# Patient Record
Sex: FEMALE | Race: BLACK OR AFRICAN AMERICAN | NOT HISPANIC OR LATINO | URBAN - METROPOLITAN AREA
[De-identification: names, ages, dates, MRNs, and addresses within clinical notes are randomized per-mention and may not be internally consistent; named-entity substitution may affect disease eponyms.]

---
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
## 2020-02-21 ENCOUNTER — RX ONLY (RX ONLY)
Age: 20
End: 2020-02-21

## 2020-02-21 ENCOUNTER — OTHER- (OUTPATIENT)
Dept: URBAN - METROPOLITAN AREA CLINIC 9 | Facility: CLINIC | Age: 20
Setting detail: DERMATOLOGY
End: 2020-02-21

## 2020-02-21 DIAGNOSIS — Z08 ENCOUNTER FOR FOLLOW-UP EXAMINATION AFTER COMPLETED TREATMENT FOR MALIGNANT NEOPLASM: ICD-10-CM

## 2020-02-21 PROCEDURE — 99213 OFFICE O/P EST LOW 20 MIN: CPT

## 2020-02-21 RX ORDER — HYDROCORTISONE BUTYRATE 1 MG/G
1 APPLICATION CREAM TOPICAL BID
Qty: 15 | Refills: 1
Start: 2020-02-21

## 2020-06-03 ENCOUNTER — RX ONLY (RX ONLY)
Age: 20
End: 2020-06-03

## 2020-06-03 RX ORDER — HYDROCORTISONE BUTYRATE 1 MG/G
1 APPLICATION CREAM TOPICAL BID
Qty: 15 | Refills: 1
Start: 2020-06-03

## 2024-06-07 ENCOUNTER — OFFICE VISIT (OUTPATIENT)
Age: 24
End: 2024-06-07
Payer: COMMERCIAL

## 2024-06-07 VITALS
BODY MASS INDEX: 32.18 KG/M2 | HEART RATE: 85 BPM | RESPIRATION RATE: 18 BRPM | TEMPERATURE: 97.9 F | HEIGHT: 67 IN | DIASTOLIC BLOOD PRESSURE: 66 MMHG | SYSTOLIC BLOOD PRESSURE: 100 MMHG | WEIGHT: 205 LBS | OXYGEN SATURATION: 99 %

## 2024-06-07 DIAGNOSIS — E66.9 OBESITY (BMI 30-39.9): ICD-10-CM

## 2024-06-07 DIAGNOSIS — Z13.0 SCREENING FOR ENDOCRINE, NUTRITIONAL, METABOLIC AND IMMUNITY DISORDER: ICD-10-CM

## 2024-06-07 DIAGNOSIS — Z13.228 SCREENING FOR ENDOCRINE, NUTRITIONAL, METABOLIC AND IMMUNITY DISORDER: ICD-10-CM

## 2024-06-07 DIAGNOSIS — L30.9 ECZEMA, UNSPECIFIED TYPE: ICD-10-CM

## 2024-06-07 DIAGNOSIS — Z13.21 SCREENING FOR ENDOCRINE, NUTRITIONAL, METABOLIC AND IMMUNITY DISORDER: ICD-10-CM

## 2024-06-07 DIAGNOSIS — Z13.29 SCREENING FOR ENDOCRINE, NUTRITIONAL, METABOLIC AND IMMUNITY DISORDER: ICD-10-CM

## 2024-06-07 DIAGNOSIS — R53.82 CHRONIC FATIGUE: Primary | ICD-10-CM

## 2024-06-07 DIAGNOSIS — J30.1 SEASONAL ALLERGIC RHINITIS DUE TO POLLEN: ICD-10-CM

## 2024-06-07 DIAGNOSIS — Z76.89 ENCOUNTER TO ESTABLISH CARE: ICD-10-CM

## 2024-06-07 DIAGNOSIS — R53.82 CHRONIC FATIGUE: ICD-10-CM

## 2024-06-07 PROBLEM — B49 ALLERGIC FUNGAL SINUSITIS (AFS): Status: ACTIVE | Noted: 2018-11-30

## 2024-06-07 PROBLEM — L83 ACANTHOSIS NIGRICANS: Status: ACTIVE | Noted: 2023-04-03

## 2024-06-07 PROBLEM — R10.2 PELVIC PAIN: Status: RESOLVED | Noted: 2023-09-01 | Resolved: 2024-06-07

## 2024-06-07 PROBLEM — R10.2 PELVIC PAIN: Status: ACTIVE | Noted: 2023-09-01

## 2024-06-07 PROBLEM — J30.89 ALLERGIC FUNGAL SINUSITIS (AFS): Status: ACTIVE | Noted: 2018-11-30

## 2024-06-07 PROBLEM — L70.0 ACNE VULGARIS: Status: ACTIVE | Noted: 2023-04-03

## 2024-06-07 PROBLEM — J32.4 CHRONIC PANSINUSITIS: Status: ACTIVE | Noted: 2022-11-22

## 2024-06-07 LAB
ALBUMIN SERPL-MCNC: 3.5 G/DL (ref 3.5–5)
ALBUMIN/GLOB SERPL: 0.8 (ref 1.1–2.2)
ALP SERPL-CCNC: 55 U/L (ref 45–117)
ALT SERPL-CCNC: 19 U/L (ref 12–78)
ANION GAP SERPL CALC-SCNC: 3 MMOL/L (ref 5–15)
AST SERPL-CCNC: 17 U/L (ref 15–37)
BASOPHILS # BLD: 0 K/UL (ref 0–0.1)
BASOPHILS NFR BLD: 1 % (ref 0–1)
BILIRUB SERPL-MCNC: 0.3 MG/DL (ref 0.2–1)
BUN SERPL-MCNC: 10 MG/DL (ref 6–20)
BUN/CREAT SERPL: 12 (ref 12–20)
CALCIUM SERPL-MCNC: 9.3 MG/DL (ref 8.5–10.1)
CHLORIDE SERPL-SCNC: 109 MMOL/L (ref 97–108)
CHOLEST SERPL-MCNC: 195 MG/DL
CO2 SERPL-SCNC: 25 MMOL/L (ref 21–32)
CREAT SERPL-MCNC: 0.86 MG/DL (ref 0.55–1.02)
DIFFERENTIAL METHOD BLD: NORMAL
EOSINOPHIL # BLD: 0.2 K/UL (ref 0–0.4)
EOSINOPHIL NFR BLD: 4 % (ref 0–7)
ERYTHROCYTE [DISTWIDTH] IN BLOOD BY AUTOMATED COUNT: 13.1 % (ref 11.5–14.5)
EST. AVERAGE GLUCOSE BLD GHB EST-MCNC: 97 MG/DL
GLOBULIN SER CALC-MCNC: 4.3 G/DL (ref 2–4)
GLUCOSE SERPL-MCNC: 94 MG/DL (ref 65–100)
HBA1C MFR BLD: 5 % (ref 4–5.6)
HCT VFR BLD AUTO: 38.4 % (ref 35–47)
HDLC SERPL-MCNC: 89 MG/DL
HDLC SERPL: 2.2 (ref 0–5)
HGB BLD-MCNC: 12.7 G/DL (ref 11.5–16)
IMM GRANULOCYTES # BLD AUTO: 0 K/UL (ref 0–0.04)
IMM GRANULOCYTES NFR BLD AUTO: 0 % (ref 0–0.5)
LDLC SERPL CALC-MCNC: 96.8 MG/DL (ref 0–100)
LYMPHOCYTES # BLD: 2.5 K/UL (ref 0.8–3.5)
LYMPHOCYTES NFR BLD: 42 % (ref 12–49)
MCH RBC QN AUTO: 30.4 PG (ref 26–34)
MCHC RBC AUTO-ENTMCNC: 33.1 G/DL (ref 30–36.5)
MCV RBC AUTO: 91.9 FL (ref 80–99)
MONOCYTES # BLD: 0.5 K/UL (ref 0–1)
MONOCYTES NFR BLD: 8 % (ref 5–13)
NEUTS SEG # BLD: 2.7 K/UL (ref 1.8–8)
NEUTS SEG NFR BLD: 45 % (ref 32–75)
NRBC # BLD: 0 K/UL (ref 0–0.01)
NRBC BLD-RTO: 0 PER 100 WBC
PLATELET # BLD AUTO: 287 K/UL (ref 150–400)
PMV BLD AUTO: 9.9 FL (ref 8.9–12.9)
POTASSIUM SERPL-SCNC: 4.7 MMOL/L (ref 3.5–5.1)
PROT SERPL-MCNC: 7.8 G/DL (ref 6.4–8.2)
RBC # BLD AUTO: 4.18 M/UL (ref 3.8–5.2)
SODIUM SERPL-SCNC: 137 MMOL/L (ref 136–145)
TRIGL SERPL-MCNC: 46 MG/DL
TSH SERPL DL<=0.05 MIU/L-ACNC: 1.61 UIU/ML (ref 0.36–3.74)
VLDLC SERPL CALC-MCNC: 9.2 MG/DL
WBC # BLD AUTO: 5.9 K/UL (ref 3.6–11)

## 2024-06-07 PROCEDURE — G8427 DOCREV CUR MEDS BY ELIG CLIN: HCPCS | Performed by: NURSE PRACTITIONER

## 2024-06-07 PROCEDURE — 1036F TOBACCO NON-USER: CPT | Performed by: NURSE PRACTITIONER

## 2024-06-07 PROCEDURE — G8417 CALC BMI ABV UP PARAM F/U: HCPCS | Performed by: NURSE PRACTITIONER

## 2024-06-07 PROCEDURE — 99204 OFFICE O/P NEW MOD 45 MIN: CPT | Performed by: NURSE PRACTITIONER

## 2024-06-07 RX ORDER — NORETHINDRONE ACETATE AND ETHINYL ESTRADIOL 1MG-20(21)
1 KIT ORAL
COMMUNITY
Start: 2018-10-21

## 2024-06-07 RX ORDER — CLOBETASOL PROPIONATE 0.5 MG/G
CREAM TOPICAL WEEKLY
COMMUNITY

## 2024-06-07 RX ORDER — NORETHINDRONE ACETATE AND ETHINYL ESTRADIOL AND FERROUS FUMARATE 1MG-20(21)
1 KIT ORAL DAILY
COMMUNITY
End: 2024-06-07 | Stop reason: ALTCHOICE

## 2024-06-07 SDOH — ECONOMIC STABILITY: HOUSING INSECURITY
IN THE LAST 12 MONTHS, WAS THERE A TIME WHEN YOU DID NOT HAVE A STEADY PLACE TO SLEEP OR SLEPT IN A SHELTER (INCLUDING NOW)?: NO

## 2024-06-07 SDOH — ECONOMIC STABILITY: FOOD INSECURITY: WITHIN THE PAST 12 MONTHS, THE FOOD YOU BOUGHT JUST DIDN'T LAST AND YOU DIDN'T HAVE MONEY TO GET MORE.: NEVER TRUE

## 2024-06-07 SDOH — ECONOMIC STABILITY: INCOME INSECURITY: HOW HARD IS IT FOR YOU TO PAY FOR THE VERY BASICS LIKE FOOD, HOUSING, MEDICAL CARE, AND HEATING?: NOT HARD AT ALL

## 2024-06-07 SDOH — ECONOMIC STABILITY: FOOD INSECURITY: WITHIN THE PAST 12 MONTHS, YOU WORRIED THAT YOUR FOOD WOULD RUN OUT BEFORE YOU GOT MONEY TO BUY MORE.: NEVER TRUE

## 2024-06-07 ASSESSMENT — PATIENT HEALTH QUESTIONNAIRE - PHQ9
2. FEELING DOWN, DEPRESSED OR HOPELESS: NOT AT ALL
1. LITTLE INTEREST OR PLEASURE IN DOING THINGS: NOT AT ALL
SUM OF ALL RESPONSES TO PHQ QUESTIONS 1-9: 0
SUM OF ALL RESPONSES TO PHQ9 QUESTIONS 1 & 2: 0

## 2024-06-07 NOTE — PROGRESS NOTES
Clemencia Jones is a 23 y.o. female  Chief Complaint   Patient presents with    New Patient     Fasting- having issues with fatigue-        Vitals:    06/07/24 0822   BP: 100/66   Pulse: 85   Resp: 18   Temp: 97.9 °F (36.6 °C)   SpO2: 99%      Wt Readings from Last 3 Encounters:   06/07/24 93 kg (205 lb)     BMI Readings from Last 3 Encounters:   06/07/24 31.80 kg/m²     Health Maintenance Due   Topic Date Due    Hepatitis B vaccine (1 of 3 - 3-dose series) Never done    Varicella vaccine (1 of 2 - 2-dose childhood series) Never done    Depression Screen  Never done    HIV screen  Never done    Chlamydia/GC screen  Never done    Hepatitis C screen  Never done    Pap smear  Never done    COVID-19 Vaccine (5 - 2023-24 season) 09/01/2023     HPI  Patient is here to establish care  Previous PCP  [place] moved [place] moved after [place].    PMH-    Eczema  Allergies  Chronic sinusitis    Past Surgical History:   Procedure Laterality Date    SINUS SURGERY  12/2018    SINUS SURGERY  02/2021    SINUS SURGERY  12/2022       Concerns today:   Fatigue- tired after grad school.  Sleep deprived. 10-5 am.  Diet is good.    Denies heavy menses.     Care Gaps-  OB-GYN- [facility] Dr. Webb  PAP- 2023/   another in 1 month  OC yrs.    Dermatology- Eczema- Dr Smiley. Discoloration of skin. No diabetes.   Acne medication  GYN- Dr. Anaya  Otolaryngology- Dr Webb- nasal obstruction/ allergic rhinitis/    Immunizations: UTD    Lifestyle:  Diet/ nutrition- no  Exercise- resistance training  3-4 x / week  Work life- PT  [employer]   Social History     Socioeconomic History    Marital status: Single     Spouse name: Not on file    Number of children: Not on file    Years of education: Not on file    Highest education level: Not on file   Occupational History    Not on file   Tobacco Use    Smoking status: Never    Smokeless tobacco: Never   Vaping Use    Vaping Use: Never used   Substance and Sexual Activity    Alcohol use: Yes     Comment:

## 2024-06-07 NOTE — PROGRESS NOTES
I have reviewed all needed documentation in preparation for visit. Verified patient by name and date of birth  Chief Complaint   Patient presents with    New Patient     Fasting- having issues with fatigue-        Vitals:    06/07/24 0822   BP: 100/66   Site: Right Upper Arm   Position: Sitting   Cuff Size: Medium Adult   Pulse: 85   Resp: 18   Temp: 97.9 °F (36.6 °C)   TempSrc: Temporal   SpO2: 99%   Weight: 93 kg (205 lb)   Height: 1.71 m (5' 7.32\")       Health Maintenance Due   Topic Date Due    Hepatitis B vaccine (1 of 3 - 3-dose series) Never done    Varicella vaccine (1 of 2 - 2-dose childhood series) Never done    Depression Screen  Never done    HIV screen  Never done    Chlamydia/GC screen  Never done    Hepatitis C screen  Never done    Pap smear  Never done    COVID-19 Vaccine (5 - 2023-24 season) 09/01/2023     \"Have you been to the ER, urgent care clinic since your last visit?  Hospitalized since your last visit?\"    NO    “Have you seen or consulted any other health care providers outside of [facility] since your last visit?”    YES     “Have you had a pap smear?”    Not sure    No cervical cancer screening on file             Click Here for Release of Records Request         Selene brambila CMA